# Patient Record
Sex: FEMALE | Race: WHITE
[De-identification: names, ages, dates, MRNs, and addresses within clinical notes are randomized per-mention and may not be internally consistent; named-entity substitution may affect disease eponyms.]

---

## 2017-06-19 ENCOUNTER — HOSPITAL ENCOUNTER (EMERGENCY)
Dept: HOSPITAL 62 - ER | Age: 62
Discharge: HOME | End: 2017-06-19
Payer: MEDICARE

## 2017-06-19 VITALS — SYSTOLIC BLOOD PRESSURE: 148 MMHG | DIASTOLIC BLOOD PRESSURE: 89 MMHG

## 2017-06-19 DIAGNOSIS — M25.532: ICD-10-CM

## 2017-06-19 DIAGNOSIS — S52.92XA: Primary | ICD-10-CM

## 2017-06-19 DIAGNOSIS — S62.92XA: ICD-10-CM

## 2017-06-19 DIAGNOSIS — S62.609A: ICD-10-CM
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
## 2017-06-19 PROCEDURE — 96376 TX/PRO/DX INJ SAME DRUG ADON: CPT

## 2017-06-19 PROCEDURE — 73110 X-RAY EXAM OF WRIST: CPT

## 2017-06-19 PROCEDURE — 96374 THER/PROPH/DIAG INJ IV PUSH: CPT

## 2017-06-19 PROCEDURE — 99283 EMERGENCY DEPT VISIT LOW MDM: CPT

## 2017-06-19 PROCEDURE — 29125 APPL SHORT ARM SPLINT STATIC: CPT

## 2017-06-19 PROCEDURE — 2W3DX1Z IMMOBILIZATION OF LEFT LOWER ARM USING SPLINT: ICD-10-PCS | Performed by: INTERNAL MEDICINE

## 2017-06-19 NOTE — ER DOCUMENT REPORT
HPI





- HPI


Patient complains to provider of: left wrist pain


Onset: Just prior to arrival


Onset/Duration: Sudden


Quality of pain: Achy


Severity: Moderate


Pain Level: 3


Context: 


Patient presents to the emergency department with left wrist pain. FOOSH 

injury. Patient reports she fell going to the bathroom.  She reports she did 

not get dizzy, she just slipped and fell.  Patient was brought in by EMS and 

received 40 mcg of fentanyl.  Reports denies past medical history of injury to 

the wrist.


Associated Symptoms: None


Exacerbated by: Movement


Relieved by: Denies


Similar symptoms previously: No


Recently seen / treated by doctor: No





- DERM


Skin Color: Normal





Past Medical History





- General


Information source: Patient





- Social History


Smoking Status: Never Smoker


Cigarette use (# per day): No


Frequency of alcohol use: None


Drug Abuse: None


Lives with: Family


Family History: Reviewed & Not Pertinent


Patient has suicidal ideation: No


Patient has homicidal ideation: No





- Medical History


Medical History: Other - lupus


Pulmonary Medical History: Reports: Hx Pneumonia


Endocrine Medical History: Reports: Hx Hypothyroidism


Renal/ Medical History: Denies: Hx Peritoneal Dialysis


Surgical Hx: Negative





Vertical Provider Document





- CONSTITUTIONAL


Agree With Documented VS: Yes


Exam Limitations: No Limitations


General Appearance: WD/WN, Mild Distress - winces when wrist palpated





- INFECTION CONTROL


TRAVEL OUTSIDE OF THE [place] IN LAST 30 DAYS: No





- HEENT


HEENT: Atraumatic, Normocephalic





- NECK


Neck: Supple





- RESPIRATORY


Respiratory: No Respiratory Distress


O2 Sat by Pulse Oximetry: 98





- CARDIOVASCULAR


Cardiovascular: Regular Rate





- MUSCULOSKELETAL/EXTREMETIES


Musculoskeletal/Extremeties: Tender - left wrist with swelling, ttp, good 

radial pulse, brisk cap refill





Course





- Re-evaluation


Re-evalutation: 





06/19/17 13:42


Patient instructed on fracture, plan of care,splint  Patient reports that when 

she had carpal tunnel surgery done in January she was able to take Tylenol with 

codeine.  Patient orthopedic contacted [facility] and appointment arranged 

for her.





- Vital Signs


Vital signs: 


 











Temp Pulse Resp BP Pulse Ox


 


 98.1 F   67   16   147/69 H  98 


 


 06/19/17 11:09  06/19/17 11:09  06/19/17 11:09  06/19/17 11:09  06/19/17 11:09














- Diagnostic Test


Radiology reviewed: Image reviewed, Reports reviewed - impacted  fracture 

distal radius with mild volar angulation





Procedures





- Immobilization


  ** Left Wrist


Immobilizer type: Sugar tong, Sling


Performed by: PCT


Post-Proc Neuro Vasc Exam: Unchanged from pre-exam


Alignment checked and good: Yes





Discharge





- Discharge


Clinical Impression: 


 Elevated blood pressure reading





Left wrist injury


Qualifiers:


 Encounter type: initial encounter Qualified Code(s): S69.92XA - Unspecified 

injury of left wrist, hand and finger(s), initial encounter





Radial fracture


Qualifiers:


 Encounter type: initial encounter Radius location: distal Fracture type: 

closed Fracture morphology: unspecified fracture morphology 





Disposition: HOME, SELF-CARE


Instructions:  Splint Pending Casting ([facility]), Fractured Radius ([facility]), 

Acetaminophen with Codeine ([facility])


Additional Instructions: 


*You have been evaluated for left wrist injury, radial fracture


*Maintain the splint


*Rest/Ice/Elevate your wrist


*Follow up with [facility] Thursday June 22nd at 1:00 pm


*Take medication as prescribed for pain


*Return to ED for worsening condition, changes, needs





Prescriptions: 


Acetaminophen with Codeine [Tylenol #3 Tablet] 1 each PO Q4HP PRN #30 tablet


 PRN Reason: 


Referrals: 


IGOR TREJO MD [Primary Care Provider] - Follow up in 1 week


[facility] [Provider Group] - 06/22/17 1:00 pm

## 2017-06-19 NOTE — RADIOLOGY REPORT (SQ)
EXAM DESCRIPTION:  WRIST LEFT 3 VIEWS



COMPLETED DATE/TIME:  6/19/2017 1:05 pm



REASON FOR STUDY:  fell, wrist pain



COMPARISON:  None.



NUMBER OF VIEWS:  Three views.



TECHNIQUE:  AP, lateral, and oblique radiographic images acquired of the left wrist.



LIMITATIONS:  None.



FINDINGS:  MINERALIZATION: Osteopenia.

BONES: There is a comminuted, impacted fracture of the distal radius with mild volar angulation.

SOFT TISSUES: No soft tissue swelling.  No foreign body.

OTHER: No other significant finding.



IMPRESSION:  Radial fracture.



TECHNICAL DOCUMENTATION:  JOB ID:  [id number]

 2011 [employer]- All Rights Reserved

## 2018-11-04 ENCOUNTER — HOSPITAL ENCOUNTER (OUTPATIENT)
Dept: HOSPITAL 62 - ER | Age: 63
Setting detail: OBSERVATION
LOS: 3 days | Discharge: ANOTHER HOSPITAL | End: 2018-11-07
Attending: INTERNAL MEDICINE | Admitting: INTERNAL MEDICINE
Payer: MEDICARE

## 2018-11-04 DIAGNOSIS — M06.9: ICD-10-CM

## 2018-11-04 DIAGNOSIS — I10: ICD-10-CM
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
## 2018-11-04 DIAGNOSIS — J44.9: ICD-10-CM
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
## 2018-11-04 DIAGNOSIS — Z79.899: ICD-10-CM

## 2018-11-04 DIAGNOSIS — R26.89: ICD-10-CM
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
## 2018-11-04 DIAGNOSIS — M79.7: ICD-10-CM

## 2018-11-04 DIAGNOSIS — M32.9: ICD-10-CM

## 2018-11-04 DIAGNOSIS — Z79.82: ICD-10-CM

## 2018-11-04 DIAGNOSIS — S82.024A: ICD-10-CM

## 2018-11-04 DIAGNOSIS — S82.832A: Primary | ICD-10-CM

## 2018-11-04 DIAGNOSIS — E03.9: ICD-10-CM

## 2018-11-04 LAB
ADD MANUAL DIFF: NO
ANION GAP SERPL CALC-SCNC: 11 MMOL/L (ref 5–19)
BASOPHILS # BLD AUTO: 0.1 10^3/UL (ref 0–0.2)
BASOPHILS NFR BLD AUTO: 1 % (ref 0–2)
BUN SERPL-MCNC: 15 MG/DL (ref 7–20)
CALCIUM: 9.2 MG/DL (ref 8.4–10.2)
CHLORIDE SERPL-SCNC: 104 MMOL/L (ref 98–107)
CO2 SERPL-SCNC: 27 MMOL/L (ref 22–30)
EOSINOPHIL # BLD AUTO: 0.1 10^3/UL (ref 0–0.6)
EOSINOPHIL NFR BLD AUTO: 2.2 % (ref 0–6)
ERYTHROCYTE [DISTWIDTH] IN BLOOD BY AUTOMATED COUNT: 13.1 % (ref 11.5–14)
GLUCOSE SERPL-MCNC: 110 MG/DL (ref 75–110)
HCT VFR BLD CALC: 40.3 % (ref 36–47)
HGB BLD-MCNC: 13.6 G/DL (ref 12–15.5)
LYMPHOCYTES # BLD AUTO: 1.6 10^3/UL (ref 0.5–4.7)
LYMPHOCYTES NFR BLD AUTO: 24.7 % (ref 13–45)
MCH RBC QN AUTO: 30.3 PG (ref 27–33.4)
MCHC RBC AUTO-ENTMCNC: 33.9 G/DL (ref 32–36)
MCV RBC AUTO: 90 FL (ref 80–97)
MONOCYTES # BLD AUTO: 0.4 10^3/UL (ref 0.1–1.4)
MONOCYTES NFR BLD AUTO: 6.3 % (ref 3–13)
NEUTROPHILS # BLD AUTO: 4.4 10^3/UL (ref 1.7–8.2)
NEUTS SEG NFR BLD AUTO: 65.8 % (ref 42–78)
PLATELET # BLD: 234 10^3/UL (ref 150–450)
POTASSIUM SERPL-SCNC: 3.6 MMOL/L (ref 3.6–5)
RBC # BLD AUTO: 4.5 10^6/UL (ref 3.72–5.28)
SODIUM SERPL-SCNC: 141.6 MMOL/L (ref 137–145)
TOTAL CELLS COUNTED % (AUTO): 100 %
WBC # BLD AUTO: 6.6 10^3/UL (ref 4–10.5)

## 2018-11-04 PROCEDURE — L4350 ANKLE CONTROL ORTHO PRE OTS: HCPCS

## 2018-11-04 PROCEDURE — 97116 GAIT TRAINING THERAPY: CPT

## 2018-11-04 PROCEDURE — L1902 AFO ANKLE GAUNTLET PRE OTS: HCPCS

## 2018-11-04 PROCEDURE — L1830 KO IMMOB CANVAS LONG PRE OTS: HCPCS

## 2018-11-04 PROCEDURE — 73610 X-RAY EXAM OF ANKLE: CPT

## 2018-11-04 PROCEDURE — 73700 CT LOWER EXTREMITY W/O DYE: CPT

## 2018-11-04 PROCEDURE — 97163 PT EVAL HIGH COMPLEX 45 MIN: CPT

## 2018-11-04 PROCEDURE — 97110 THERAPEUTIC EXERCISES: CPT

## 2018-11-04 PROCEDURE — 97166 OT EVAL MOD COMPLEX 45 MIN: CPT

## 2018-11-04 PROCEDURE — 96374 THER/PROPH/DIAG INJ IV PUSH: CPT

## 2018-11-04 PROCEDURE — 97530 THERAPEUTIC ACTIVITIES: CPT

## 2018-11-04 PROCEDURE — 73564 X-RAY EXAM KNEE 4 OR MORE: CPT

## 2018-11-04 PROCEDURE — 97535 SELF CARE MNGMENT TRAINING: CPT

## 2018-11-04 PROCEDURE — 99285 EMERGENCY DEPT VISIT HI MDM: CPT

## 2018-11-04 PROCEDURE — G0378 HOSPITAL OBSERVATION PER HR: HCPCS

## 2018-11-04 PROCEDURE — 80048 BASIC METABOLIC PNL TOTAL CA: CPT

## 2018-11-04 PROCEDURE — 85025 COMPLETE CBC W/AUTO DIFF WBC: CPT

## 2018-11-04 PROCEDURE — 36415 COLL VENOUS BLD VENIPUNCTURE: CPT

## 2018-11-04 NOTE — RADIOLOGY REPORT (SQ)
EXAM DESCRIPTION:  KNEE RIGHT 4 VIEWS



COMPLETED DATE/TIME:  11/4/2018 8:21 pm



REASON FOR STUDY:  fall injury with swelling



COMPARISON:  None.



EXAM PARAMETERS:  NUMBER OF VIEWS: Four views.

TECHNIQUE: AP, lateral and both oblique  radiographic images acquired of the right knee.

LIMITATIONS: None.



FINDINGS:  MINERALIZATION: Osteopenia.

BONES: Possible nondisplaced longitudinal fracture in the lateral body of the patella.  No other frac
ture or dislocation.  No worrisome bone lesions.

JOINTS: Small effusion.

SOFT TISSUES: No significant soft tissue swelling.  No radiopaque foreign body.

OTHER: No other significant finding.



IMPRESSION:  Possible nondisplaced longitudinal fracture in the lateral body of the patella.



TECHNICAL DOCUMENTATION:  JOB ID:  [id number]

[id number]

 2011 [employer]- All Rights Reserved



Reading location - IP/workstation name: [email or web address]

## 2018-11-04 NOTE — RADIOLOGY REPORT (SQ)
EXAM DESCRIPTION: 



CT LOWER EXTREMITY WITHOUT IV CONTRAST



COMPLETED DATE/TME:  11/04/2018 21:09



CLINICAL HISTORY: 



63 years, Female, Knee pain



This exam was performed according to our departmental

dose-optimization program which includes automated exposure

control, adjustment of the mA and/or kVp according to patient

size and/or use of iterative reconstruction technique where

applicable.



Findings: There is a mild joint effusion. There is a

calcification in the suprapatellar joint effusion medially,

measuring 1.3 x 0.7 cm. This may represent a loose body. There

are moderate tricompartmental degenerative changes. There is a

nondisplaced vertical patellar fracture noted. Significant

osteophytes are noted.



IMPRESSION:



Nondisplaced acute patellar fracture. Moderate degenerative

changes with probable loose body calcification in the

suprapatellar joint with mild joint effusion.

## 2018-11-04 NOTE — RADIOLOGY REPORT (SQ)
EXAM DESCRIPTION:  ANKLE LEFT COMPLETE



COMPLETED DATE/TIME:  11/4/2018 8:21 pm



REASON FOR STUDY:  fall injury with ? deformity



COMPARISON:  None.



EXAM PARAMETERS:  NUMBER OF VIEWS: Three views.

TECHNIQUE: AP, lateral and oblique  radiographic images acquired of the left ankle.

LIMITATIONS: None.



FINDINGS:  MINERALIZATION: Normal.

BONES: Transverse Nondisplaced fracture of the distal fibula- lower lateral malleolus.  No other frac
ture or dislocation.  No worrisome bone lesions.

JOINTS: No effusion.

SOFT TISSUES: Moderate soft tissue swelling.  No radiopaque foreign body.

OTHER: No other significant finding.



IMPRESSION:  Transverse Nondisplaced fracture of the distal fibula- lower lateral malleolus.  No othe
r fracture or dislocation.



TECHNICAL DOCUMENTATION:  JOB ID:  [id number]

[id number]

 2011 [employer]- All Rights Reserved



Reading location - IP/workstation name: [email or web address]

## 2018-11-04 NOTE — ER DOCUMENT REPORT
ED General





- General


Mode of Arrival: Medic


Information source: Patient, Relative - Daughter


TRAVEL OUTSIDE OF THE [place] IN LAST 30 DAYS: No





- HPI


Onset: Just prior to arrival


Onset/Duration: Sudden


Quality of pain: Sharp, Throbbing





<DAVEY GUZMAN - Last Filed: 11/05/18 00:16>





<MALIK ALMODOVARIN - Last Filed: 11/05/18 00:48>





- General


Chief Complaint: Fall Injury


Stated Complaint: KNEE PAIN


Time Seen by Provider: 11/04/18 19:57





- HPI


Notes: 





53-year-old female brought in by ambulance after tripping and falling brought 

in with positive deformity of her left lateral ankle and her right knee.  She 

is complaining of severe pain in her right knee her left ankle over the fibula.

  Patient is unable to bear weight on either extremity.  She did not hit her 

head and there was no loss of consciousness.  Enroute she was given 100 mcg x1 

by the medics and Zofran 4 mg x1. (DAVEY GUZMAN)





- Related Data


Allergies/Adverse Reactions: 


 





hydrochlorothiazide [Hydrochlorothiazide] Allergy (Verified 11/04/18 20:11)


 


morphine [Morphine] Allergy (Verified 11/04/18 20:11)


 


Penicillins Allergy (Verified 11/04/18 20:11)


 


rofecoxib [From Vioxx] Allergy (Verified 11/04/18 20:11)


 


Sulfa (Sulfonamide Antibiotics) Allergy (Verified 11/04/18 20:11)


 











Past Medical History





- General


Information source: Patient





- Social History


Smoking Status: Never Smoker


Frequency of alcohol use: None


Drug Abuse: None


Family History: Reviewed & Not Pertinent


Patient has suicidal ideation: No


Patient has homicidal ideation: No





- Past Medical History


Cardiac Medical History: Reports: Hx Hypertension


Pulmonary Medical History: Reports: Hx Asthma, Hx COPD, Hx Pneumonia


Endocrine Medical History: Reports: Hx Hypothyroidism


Renal/ Medical History: Denies: Hx Peritoneal Dialysis


Musculoskeletal Medical History: Reports Hx Arthritis - RA


Past Surgical History: Reports: Hx Hysterectomy, Hx Orthopedic Surgery - L arm





<DAVEY GUZMAN - Last Filed: 11/05/18 00:16>





Review of Systems





- Review of Systems


Constitutional: Weakness.  denies: Chills, Diaphoresis


EENT: No symptoms reported


Cardiovascular: No symptoms reported


Respiratory: No symptoms reported


Gastrointestinal: No symptoms reported


Musculoskeletal: Deformity, Leg swelling





<DAVEY GUZMAN - Last Filed: 11/05/18 00:16>





Physical Exam





- General


General appearance: Appears well, Anxious


In distress: Mild





- HEENT


Head: Normocephalic


Eyes: Normal


Conjunctiva: Normal


Extraocular movements intact: Yes





- Respiratory


Respiratory status: No respiratory distress


Chest status: Nontender


Breath sounds: Normal


Chest palpation: Normal





- Cardiovascular


Rhythm: Regular


Heart sounds: Normal auscultation, S1 appreciated, S2 appreciated


Murmur: Yes


Systolic murmur grade 1-6: 2


Pulses: Normal: Posterior tibial - Bilateral 2+, Dorsalis pedis - Bilateral 2+





- Abdominal


Inspection: Normal


Distension: No distension


Tenderness: Nontender





- Extremities


General lower extremity: Tender - Exquisitely tender to palpation R knee around 

patella. Positive TTP L lateral malleolus


Knee: Tender, Abrasion, Pain with ROM, Tender joint line - R knee TTP, abrasion 

noted anterolateral, severe pain with ROM, cannot bear weight, Unable to bear 

weight.  No: Popliteal fossa tender


Calf: Normal


Ankle: Tender, Deformity, Edema, Limited ROM, Unable to bear weight - L ankle 

TTP lateral malleolus, edema, mild deformity


Foot: Normal





- Neurological


Sensory: Altered light touch - Pt with normal sensation to light touch R lower 

extremity, decreased sensation to light touch L lower extremity.





<DAVEY GUZMAN - Last Filed: 11/05/18 00:16>





- Vital signs


Vitals: 





 











Temp Pulse Resp BP Pulse Ox


 


 98.2 F   72   24 H  142/89 H  100 


 


 11/04/18 19:34  11/04/18 19:34  11/04/18 19:34  11/04/18 19:34  11/04/18 19:34














Course





- Laboratory


Result Diagrams: 


 11/04/18 22:55





 11/04/18 22:55





<DAVEY GUZMAN - Last Filed: 11/05/18 00:16>





- Laboratory


Result Diagrams: 


 11/04/18 22:55





 11/04/18 22:55





<HAYLEE ALMODOVAR - Last Filed: 11/05/18 00:48>





- Re-evaluation


Re-evalutation: 





11/04/18 21:23


XR of distal L extremity shows fibula fx. Radiology read of R knee indicates 

possible longitudinal patella fx. Questionable lipohemarthrosis R knee. Pt 

endorses severe pain. Will give fentanyl 50 mcg x 1. 


11/04/18 22:42


CT L lower extremity completed. Confirms R non-displaced acute patellar fx 


11/04/18 22:47





11/04/18 23:52


Patient was placed in a L ankle stirrup brace and a R knee immobilizer and 

attempted to ambulate on crutches. Pt had great difficulty ambulating 10 feet 

and with severe pain and deemed unable to safely ambulate. Pt stated she will 

not be able to safely ambulate at home. Discussed with Dr. Almodovar and best 

course of action is to admit patient overnight. Consulted with hospitalist, she 

is concerned about medical management and is going to contact orthopedics. 

Waiting to speak with her. 


11/05/18 00:15


Nishi discussed with Dr. Allen will admit to medical floor. (DAVEY GUZMAN)








11/05/18 00:46


I did evaluate the patient at bedside patient has a x-ray showing a left distal 

fibula fracture along with a patella fracture on the right.  We did apply a 

ankle stirrup splint and knee immobilizer splint to the left immobilizer to the 

right leg and supply crutches to the patient patient had a extremely difficult 

time and walking taking approximately 10 minutes to go less than 10 feet.  

Because of difficulty in ambulation recommend at this time patient be admitted 

to the hospital staff for further evaluation.  At this time do not see any need 

for emergent operative management of these fractures hospitalist will consult 

orthopedics discussed with Dr. Landeros agrees with admission to the floor (

HAYLEE ALMODOVAR)





- Vital Signs


Vital signs: 





 











Temp Pulse Resp BP Pulse Ox


 


 99.2 F   72   20   158/80 H  99 


 


 11/04/18 21:45  11/04/18 21:45  11/04/18 21:45  11/04/18 21:45  11/04/18 21:45














Discharge





- Discharge


Admitting Provider: Hospitalist - Barre


Unit Admitted: Medical Floor





<DAVEY GUZMAN - Last Filed: 11/05/18 00:16>





<HAYLEE ALMODOVAR - Last Filed: 11/05/18 00:48>





- Discharge


Clinical Impression: 


 Inability to ambulate due to knee, Inability to ambulate due to ankle or foot, 

Fibromyalgia





Fracture of distal fibula


Qualifiers:


 Encounter type: initial encounter Fracture type: closed Fracture morphology: 

unspecified fracture morphology Laterality: left Qualified Code(s): S82.832A - 

Other fracture of upper and lower end of left fibula, initial encounter for 

closed fracture





Patella fracture


Qualifiers:


 Encounter type: initial encounter Fracture type: closed Fracture morphology: 

longitudinal Fracture alignment: nondisplaced Laterality: right Qualified Code(s

): S82.024A - Nondisplaced longitudinal fracture of right patella, initial 

encounter for closed fracture





Lupus


Qualifiers:


 Lupus erythematosus form: unspecified Qualified Code(s): L93.0 - Discoid lupus 

erythematosus





Hypertension


Qualifiers:


 Hypertension type: essential hypertension Qualified Code(s): I10 - Essential (

primary) hypertension





Condition: Stable


Disposition: ADMITTED AS OBSERVATION

## 2018-11-05 RX ADMIN — ACETAMINOPHEN AND CODEINE PHOSPHATE PRN EACH: 300; 30 TABLET ORAL at 02:17

## 2018-11-05 RX ADMIN — ACETAMINOPHEN AND CODEINE PHOSPHATE PRN EACH: 300; 30 TABLET ORAL at 20:06

## 2018-11-05 RX ADMIN — KETOROLAC TROMETHAMINE PRN MG: 30 INJECTION, SOLUTION INTRAMUSCULAR at 21:59

## 2018-11-05 RX ADMIN — KETOROLAC TROMETHAMINE PRN MG: 30 INJECTION, SOLUTION INTRAMUSCULAR at 15:16

## 2018-11-05 RX ADMIN — HEPARIN SODIUM SCH UNIT: 5000 INJECTION, SOLUTION INTRAVENOUS; SUBCUTANEOUS at 06:17

## 2018-11-05 RX ADMIN — ACETAMINOPHEN AND CODEINE PHOSPHATE PRN EACH: 300; 30 TABLET ORAL at 11:11

## 2018-11-05 RX ADMIN — ACETAMINOPHEN AND CODEINE PHOSPHATE PRN EACH: 300; 30 TABLET ORAL at 06:32

## 2018-11-05 RX ADMIN — HEPARIN SODIUM SCH UNIT: 5000 INJECTION, SOLUTION INTRAVENOUS; SUBCUTANEOUS at 15:17

## 2018-11-05 RX ADMIN — HEPARIN SODIUM SCH UNIT: 5000 INJECTION, SOLUTION INTRAVENOUS; SUBCUTANEOUS at 21:59

## 2018-11-05 NOTE — PDOC CONSULTATION
Consultation


Consult Date: 11/05/18


Consult reason:: Left lateral malleolus fracture and right patella fracture





History of Present Illness


Admission Date/PCP: 


  11/05/18 00:37





  IGOR TREJO MD





Patient complains of: Left ankle pain and right knee pain status post fall


History of Present Illness: 


OBDULIA PIERCE is a 63 year old female with osteo-porosis.  She had a 

mechanical fall twisting her left ankle and falling directly onto her right 

knee.  Immediately she had pain in both the ankle and the right knee and was 

brought to the hospital for evaluation.  She denies any head injury or loss of 

consciousness.  Only complains of swelling and pain of the mentioned 

extremities above.  Describes the pain 5 out of 5 for both joints and decreased 

range of motion secondary to pain.  No previous surgeries or injuries to those 

extremities.  Orthopedic consulted after x-rays revealed nondisplaced lateral 

malleolus ankle fracture in the left and a nondisplaced right patella fracture.

  Denies any numbness tingling or paresthesias


With





Past Medical History


Cardiac Medical History: Reports: Hypertension


Pulmonary Medical History: Reports: Asthma, Chronic Obstructive Pulmonary 

Disease (COPD), Pneumonia


EENT Medical History: Reports: Other


Endocrine Medical History: Reports: Hypothyroidism


GI Medical History: Reports: Gastroesophageal Reflux Disease


Musculoskeltal Medical History: Reports: Arthritis, Fibromyalgia, Other - 

Rheumatoid arthritis


Hematology: Reports: Other





Past Surgical History


Past Surgical History: Reports: Hysterectomy, Orthopedic Surgery - L arm





Social History


Smoking Status: Never Smoker


Frequency of Alcohol Use: None


Hx Recreational Drug Use: No


Drugs: None


Hx Prescription Drug Abuse: No





Family History


Family History: Reviewed & Not Pertinent


Parental Family History Reviewed: No


Children Family History Reviewed: No


Sibling(s) Family History Reviewed.: No





Medication/Allergy


Home Medications: 








Aspirin [Aspirin EC] 81 mg PO DAILY 11/05/18 


Budesonide/Formoterol Fumarate [Symbicort 160-4.5 Mcg Inhaler] 2 puff IH BID 11/ 05/18 


Dexlansoprazole [Dexilant 60 mg Capsule] 60 mg PO DAILY 11/05/18 


Duloxetine HCl [Cymbalta] 30 mg PO DAILY 11/05/18 


Ergocalciferol (Vitamin D2) [Vitamin D2] 50,000 unit PO TU@1000 11/05/18 


Furosemide [Lasix 20 mg Tablet] 20 mg PO DAILY 11/05/18 


Levothyroxine Sodium [Synthroid 0.112 mg Tablet] 0.112 mg PO DAILY 11/05/18 


Potassium Chloride [Klor-Con 10 Meq Capsule ER] 10 meq PO DAILY 11/05/18 








Allergies/Adverse Reactions: 


 





hydrochlorothiazide [Hydrochlorothiazide] Allergy (Verified 11/05/18 09:53)


 SWELLING


morphine [Morphine] Allergy (Verified 11/05/18 09:53)


 Hives


Penicillins Allergy (Verified 11/05/18 09:53)


 Hives


rofecoxib [From Vioxx] Allergy (Verified 11/05/18 09:53)


 SWELLING


Sulfa (Sulfonamide Antibiotics) Allergy (Verified 11/05/18 09:53)


 THROAT ITCHING & BURNING











Review of Systems


Review of Systems: 





Constitutional: [PRESENT: as per HPI.  ABSENT: chills, fever(s), headache(s), 

weight gain, weight loss]


Eyes: [ABSENT: visual disturbances]


Ears: [ABSENT: hearing changes]


Cardiovascular: [ABSENT: chest pain, dyspnea on exertion, edema, orthropnea, 

palpitations]


Respiratory: [ABSENT: cough, hemoptysis]


Gastrointestinal: [ABSENT: abdominal pain, constipation, diarrhea, hematemesis, 

hematochezia, nausea, vomiting]


Genitourinary: [ABSENT: dysuria, hematuria]


Musculoskeletal: See HPI


Integumentary: [ABSENT: rash, wounds]


Neurological: [ABSENT: abnormal gait, abnormal speech, confusion, dizziness, 

focal weakness, syncope]


Psychiatric: [ABSENT: anxiety, depression, homicidal ideation, suicidal ideation

]


Endocrine: [ABSENT: cold intolerance, heat intolerance, menstrual abnormalities

, polydipsia, polyuria]


Hematologic/Lymphatic: [ABSENT: easy bleeding, easy bruising, lymphadenopathy]





Physical Exam


Vital Signs: 


 











Temp Pulse Resp BP Pulse Ox


 


 36.9 C   72   16   139/68 H  96 


 


 11/05/18 07:17  11/05/18 07:17  11/05/18 07:17  11/05/18 07:17  11/05/18 07:17








 Intake & Output











 11/04/18 11/05/18 11/06/18





 06:59 06:59 06:59


 


Intake Total  336 


 


Balance  336 


 


Weight  95.2 kg 











General appearance: PRESENT: no acute distress


Head exam: PRESENT: atraumatic, normocephalic


Eye exam: PRESENT: EOMI, other - Round symmetric pupils


Ear exam: PRESENT: normal external ear exam.  ABSENT: bleeding, drainage


Mouth exam: PRESENT: neck supple


Neck exam: ABSENT: lymphadenopathy, thyromegaly


Respiratory exam: PRESENT: symmetrical, unlabored.  ABSENT: accessory muscle use

, tachypnea, wheezes


Cardiovascular exam: PRESENT: RRR


Pulses: PRESENT: normal dorsalis pedis pul


Vascular exam: PRESENT: normal capillary refill


GI/Abdominal exam: PRESENT: soft - 100.  ABSENT: distended, tenderness


Neurological exam: PRESENT: alert, awake, oriented to person, oriented to place

, oriented to time, oriented to situation


Psychiatric exam: PRESENT: appropriate affect, normal mood


Skin exam: PRESENT: abrasion - Over the right knee, intact, normal color.  

ABSENT: skin tears - 60


Adult Front & Back Image: 


  __________________________














  __________________________





 1 - Superficial abrasion of the right knee.  Tender palpation over the 

patella.  Swelling present but not remarkable.  Decreased range of motion pain.

  Neurovascular intact distally.





 2 - Left ankle swelling laterally with tenderness over the lateral malleolus.  

Some crepitus with palpation.  Decreased range of motion second to pain.  

Neurovascular intact distally








Results


Impressions: 


 





Ankle X-Ray  11/04/18 19:43


IMPRESSION:  Transverse Nondisplaced fracture of the distal fibula- lower 

lateral malleolus.  No other fracture or dislocation.


 








Knee X-Ray  11/04/18 19:43


IMPRESSION:  Possible nondisplaced longitudinal fracture in the lateral body of 

the patella.


 








Lower Extremity CT  11/04/18 21:09


IMPRESSION:


 


Nondisplaced acute patellar fracture. Moderate degenerative


changes with probable loose body calcification in the


suprapatellar joint with mild joint effusion.


 











Status: Image reviewed by me





Assessment & Plan





- Diagnosis


(1) Fracture of distal fibula


Qualifiers: 


   Encounter type: initial encounter   Fracture type: closed   Fracture 

morphology: unspecified fracture morphology   Laterality: left   Qualified Code(

s): S82.832A - Other fracture of upper and lower end of left fibula, initial 

encounter for closed fracture   


Is this a current diagnosis for this admission?: Yes   


Plan: 


Displaced left distal fibula fracture.  Transverse under the mortise.  

Recommend protected weightbearing with walking and ice and elevate when not 

ambulating.  Continue the Aircast until she sees us in the office where we will 

apply a walking boot.








(2) Patella fracture


Qualifiers: 


   Encounter type: initial encounter   Fracture type: closed   Fracture 

morphology: longitudinal   Fracture alignment: nondisplaced   Laterality: right

   Qualified Code(s): S82.024A - Nondisplaced longitudinal fracture of right 

patella, initial encounter for closed fracture   


Is this a current diagnosis for this admission?: Yes   


Plan: 


Nondisplaced lateral facet fracture of the patella on the right knee.  is a 

longitudinal fracture of the lateral facet.  No step-off.  Patient can ambulate 

or weight-bear as tolerated with the right lower extremity but unable and 

restricted from doing range of motion due to the fracture.  When she ambulates 

she is required to use the knee immobilizer.  She may loosen or remove the knee 

immobilizer when and the right knee is straight.  Recommend using a walker for 

ambulation.








- Plan Summary


Plan Summary: 





Again protected weightbearing left lower extremity with splint on.  Weight-bear 

as tolerated of the right knee with a knee immobilizer on.  Unable to do range 

of motion due to the fracture.


Recommending physical therapy but she most likely will require a stint and 

rehab due to both extremities being fractured.


Continue pain control and DVT prophylaxis.

## 2018-11-06 RX ADMIN — HEPARIN SODIUM SCH UNIT: 5000 INJECTION, SOLUTION INTRAVENOUS; SUBCUTANEOUS at 21:25

## 2018-11-06 RX ADMIN — ACETAMINOPHEN AND CODEINE PHOSPHATE PRN EACH: 300; 30 TABLET ORAL at 13:45

## 2018-11-06 RX ADMIN — KETOROLAC TROMETHAMINE PRN MG: 30 INJECTION, SOLUTION INTRAMUSCULAR at 21:27

## 2018-11-06 RX ADMIN — HEPARIN SODIUM SCH UNIT: 5000 INJECTION, SOLUTION INTRAVENOUS; SUBCUTANEOUS at 06:04

## 2018-11-06 RX ADMIN — KETOROLAC TROMETHAMINE PRN MG: 30 INJECTION, SOLUTION INTRAMUSCULAR at 06:07

## 2018-11-06 RX ADMIN — KETOROLAC TROMETHAMINE PRN MG: 30 INJECTION, SOLUTION INTRAMUSCULAR at 12:05

## 2018-11-06 RX ADMIN — ACETAMINOPHEN AND CODEINE PHOSPHATE PRN EACH: 300; 30 TABLET ORAL at 08:26

## 2018-11-06 RX ADMIN — ACETAMINOPHEN AND CODEINE PHOSPHATE PRN EACH: 300; 30 TABLET ORAL at 23:15

## 2018-11-06 RX ADMIN — HEPARIN SODIUM SCH UNIT: 5000 INJECTION, SOLUTION INTRAVENOUS; SUBCUTANEOUS at 13:47

## 2018-11-06 NOTE — PDOC H&P
History of Present Illness


Admission Date/PCP: 


  11/05/18 00:37





  IGOR TREJO MD





Patient complains of: Left ankle pain and difficulty with mobility


History of Present Illness: 


KEYLA GALEAS is a 63-year-old female with past medical history of hypertension

, asthma, COPD, pneumonia, hypothyroidism, GERD, rheumatoid arthritis, 

fibromyalgia, systemic lupus erythematosus, hysterectomy, and left wrist ORIF 

admitted to [facility] on 11/5/2018 status post fall from 

standing height at home during which she slipped on a small pumpkin that had 

landed at the edge of her doorstep causing her to twist her left lower 

extremity and fall onto her right knee.  X-rays/CT imaging demonstrated a 

transverse nondisplaced fracture of the distal fibula on the left and a 

nondisplaced acute patellar fracture with mild joint effusion and questionable 

lipohemarthrosis of the right knee.  Orthopedic surgery was not contacted 

immediately due to the likely nonoperative nature of this case, but the patient 

had significant difficulty with ambulation using crutches and a knee 

immobilizer on the right.  Therefore, the patient was evaluated by orthopedic 

surgery and recommended protected weightbearing with walking (toe-touch 

weightbearing) and ice and elevation when not ambulating on the left with 

continued Aircast until she is seen in the office for application of a walking 

boot as well as weightbearing as tolerated with a knee immobilizer on the right 

lower extremity.  The patient is allowed to loosen or remove the knee 

immobilizer when she is in bed and the right knee is straight.  Physical 

medicine and rehabilitation consultation was requested to evaluate the patient 

for admission to acute inpatient rehabilitation.





Today, the patient was seen and examined with her  at bedside.  She 

complains of continued left ankle pain and admits that she confused the 

weightbearing restrictions and placed all of her weight through her left ankle 

when attempting to go to the bathroom.  Her left ankle has subsequently become 

swollen and warm.  Additionally, she admits that she does have arthritis 

related to her lupus, and she occasionally requires the use of a rolling walker 

for ambulation.  However, she was active and independent otherwise prior to 

this injury.  Her last bowel movement was 2 days ago, and she denies trouble 

with urination.








Past Medical History


Cardiac Medical History: Reports: Hypertension


Pulmonary Medical History: Reports: Asthma, Chronic Obstructive Pulmonary 

Disease (COPD), Pneumonia


EENT Medical History: Reports: Other


Endocrine Medical History: Reports: Hypothyroidism


GI Medical History: Reports: Gastroesophageal Reflux Disease


Musculoskeltal Medical History: Reports: Arthritis, Fibromyalgia, Other - 

Rheumatoid arthritis


Hematology: Reports: Other





Past Surgical History


Past Surgical History: Reports: Hysterectomy, Orthopedic Surgery - L arm





Social History


Smoking Status: Never Smoker


Frequency of Alcohol Use: None


Hx Recreational Drug Use: No


Drugs: None


Hx Prescription Drug Abuse: No


Past Social History Note: 


Keyla Galeas lives with her , daughter, and 3 young children in a 1 

level home with 1 steps to enter and 0 steps to the bedroom and bathroom.  She 

is a retired schoolteacher, does not smoke, does not drink alcohol, and does 

not use drugs. 





Prior Functional Status: Active and independent with mobility and all ADLs. 

Ambulates with a rolling walker occasionally.





Current Functional Status: Per therapy notes, the patient currently requires 

standby assistance for bed mobility, minimal assistance for 2 people for 

transfers, and minimum assistance for ambulation 20 feet with a rolling walker.

  Occupational therapy evaluation is pending.





Family History: Reviewed and oncontributory to the current presentation.








Family History


Family History: Reviewed & Not Pertinent


Parental Family History Reviewed: Yes


Children Family History Reviewed: Yes


Sibling(s) Family History Reviewed.: Yes





Medication/Allergy


Home Medications: 








Aspirin [Aspirin EC] 81 mg PO DAILY 11/05/18 


Budesonide/Formoterol Fumarate [Symbicort 160-4.5 Mcg Inhaler] 2 puff IH BID 11/ 05/18 


Dexlansoprazole [Dexilant 60 mg Capsule] 60 mg PO DAILY 11/05/18 


Duloxetine HCl [Cymbalta] 30 mg PO DAILY 11/05/18 


Ergocalciferol (Vitamin D2) [Vitamin D2] 50,000 unit PO TU@1000 11/05/18 


Furosemide [Lasix 20 mg Tablet] 20 mg PO DAILY 11/05/18 


Levothyroxine Sodium [Synthroid 0.112 mg Tablet] 0.112 mg PO DAILY 11/05/18 


Potassium Chloride [Klor-Con 10 Meq Capsule ER] 10 meq PO DAILY 11/05/18 








Allergies/Adverse Reactions: 


 





hydrochlorothiazide [Hydrochlorothiazide] Allergy (Verified 11/05/18 09:53)


 SWELLING


morphine [Morphine] Allergy (Verified 11/05/18 09:53)


 Hives


Penicillins Allergy (Verified 11/05/18 09:53)


 Hives


rofecoxib [From Vioxx] Allergy (Verified 11/05/18 09:53)


 SWELLING


Sulfa (Sulfonamide Antibiotics) Allergy (Verified 11/05/18 09:53)


 THROAT ITCHING & BURNING











Review of Systems


Review of Systems: 


Constitutional: No fevers, chills, sweats, weight loss


Eye: No recent visual problems, no blurry vision, no double vision


ENMT: No ear pain, nasal congestion, sore throat


Respiratory: No shortness of breath, cough, sputum production


Cardiovascular: No chest pain, palpitations, syncope


Gastrointestinal: No nausea, vomiting, diarrhea, abdominal pain


Genitourinary: No hematuria, dysuria, flank or suprapubic pain


Hema/Lymph: Negative for bruising tendency, swollen lymph glands


Endocrine: Negative for excessive thirst, excessive hunger, extreme fatigue


Musculoskeletal: Positive for left ankle and right knee pain as well as 

resulting difficulty with mobility.


Integumentary: No rash, pruritus, abrasions


Neurologic: No headaches, numbness, speech problems.  Positive for focal 

weakness of bilateral lower extremity secondary to pain.


Psychiatric: No anxiety, depression





Physical Exam


Vital Signs: 


 











Temp Pulse Resp BP Pulse Ox


 


 98.4 F   71   15   163/87 H  98 


 


 11/06/18 07:57  11/06/18 07:57  11/06/18 07:57  11/06/18 07:57  11/06/18 07:57








 Intake & Output











 11/05/18 11/06/18 11/07/18





 06:59 06:59 06:59


 


Intake Total 336 1409 


 


Balance 336 1409 


 


Weight 95.2 kg  











Exam: 


General: Awake and Alert. No acute distress. Resting comfortably in bed with 

her  at bedside.


Head: Normocephalic. Atraumatic.


Eyes: Pupils equal, round, and reactive to light. EOMI. Sclera white.


Ears: No drainage noted.


Nose: Nares normal & without exudate.


Oropharynx: Moist mucous membranes.


Neck: Supple movements.


Cardiovascular: Regular rate & rhythm. No murmurs, rubs, or gallops appreciated.


Pulmonary: Lungs clear to auscultation bilaterally. No increased work of 

breathing.


Gastrointestinal: Abdomen soft, non-tender, non-distended. Normoactive bowel 

sounds.


Skin: Texture and turgor normal. Warm and dry.


Psychiatric: Judgement and insight appear to be good. Patient is oriented to 

date, location, and situation. Affect appropriate.


Extremities: The right lower extremity is in a knee immobilizer.  The left 

ankle is edematous and warm to touch and in an Aircast.


Neurological: CN III-XII grossly intact. Sensation to light touch is grossly 

intact. Tone is normal. Proprioception is normal. No Thompson's. Speech is 

fluent with good content and without dysarthria.


Muscle Strength: Full 5/5 strength in all major muscle groups of the upper 

extremities, although she does wince in pain with resisted shoulder abduction 

due to her arthritis.  She demonstrates 0/5 strength of right hip flexors 

secondary to pain in the right lower extremity.  The right knee flexors/

extensors were not tested due to immobilizer in place.  She has 5/5 strength of 

right ankle and toe dorsiflexors/plantar flexors.  She has 3/5 strength of left 

hip and knee flexors/extensors, and the left ankle dorsiflexors/plantar flexors 

were not tested.





Results


Impressions: 


 





Ankle X-Ray  11/04/18 19:43


IMPRESSION:  Transverse Nondisplaced fracture of the distal fibula- lower 

lateral malleolus.  No other fracture or dislocation.


 








Knee X-Ray  11/04/18 19:43


IMPRESSION:  Possible nondisplaced longitudinal fracture in the lateral body of 

the patella.


 








Lower Extremity CT  11/04/18 21:09


IMPRESSION:


 


Nondisplaced acute patellar fracture. Moderate degenerative


changes with probable loose body calcification in the


suprapatellar joint with mild joint effusion.


 














Assessment & Plan





- Plan Summary


Plan Summary: 


Assessment and Plan:


63-year-old female with multiple traumatic lower extremity fractures secondary 

to mechanical fall being treated nonoperatively.





1. Gait and ADL Dysfunction secondary to multiple traumatic lower extremity 

fracture


- Continue PT and OT to maximize mobility, safety, endurance, and self-care.





2.  Left fibular fracture


- Recommended toe-touch weightbearing on the left lower extremity by orthopedic 

surgery


- The patient did have an episode of placing full weight on that ankle due to 

confusion about weightbearing restrictions, so repeat x-rays are being obtained 

to ensure no further displacement or necessary intervention


- Pain control per hospitalist medicine





3.  Right patellar fracture


- Weightbearing as tolerated on the right lower extremity with a knee 

immobilizer in place


- The knee immobilizer while in bed when the knee is straight


- Pain control per hospitalist medicine





4.  SLE/rheumatoid arthritis


- Comorbidity impacting the patient's rehabilitation course


- Resume patient's home regimen as appropriate





5.  Hypertension


- Acutely elevated secondary to pain


- Continue management per hospitalist medicine





6.  Disposition


- Based on the patient's diagnosis, medical co-morbidities, and current 

functional status, she is a good candidate for acute inpatient rehabilitation 

as she would benefit from 3 hours per day of intensive therapies in at least 2 

disciplines under the close medical supervision of a physician. The patient is 

expected to make significant gains in a relatively short period of time to the 

point that she can safely be discharged home with supervision and assistance 

from family.  





The patient was active and independent prior to admission despite her 

rheumatoid arthritis impacting her gait and necessitating the use of a rolling 

walker intermittently.  Now, the patient is severely limited in her mobility 

and ADL tasks due to her recent fractures and weightbearing restrictions.  She 

requires significant assistance to maintain safety and complete her necessary 

mobility and ADL tasks in a timely manner.  Factoring in her current functional 

status, she is not safe to discharge home with the level of assistance that she 

has available, namely her  who works full-time.  Patient would benefit 

him a high intensity short course of rehabilitation overseen by rehabilitation 

physician due to her multiple medical comorbidities that may impact her 

rehabilitation course, including her SLE, rheumatoid arthritis, COPD, and 

fibromyalgia.





Barring any unforeseen events or complications, there is a plan to admit the 

patient to acute inpatient rehabilitation at [facility] in [place] on 

Wednesday, 11/7/2018.





This case was discussed with the patient's acute care therapists, nurse on the 

floor, discharge planners, and rehabilitation coordinator at [facility]. 

Thank you for allowing us to participate in the care of this patient. Please 

call with any questions.





A total of 80 minutes was spent on face-to-face communication with the patient 

and coordination of care.

## 2018-11-06 NOTE — RADIOLOGY REPORT (SQ)
EXAM DESCRIPTION:  ANKLE LEFT COMPLETE



COMPLETED DATE/TIME:  11/6/2018 11:49 am



REASON FOR STUDY:  left ankle swolen, please compare to prior xray



COMPARISON:  11/4/2018



NUMBER OF VIEWS:  Three views.



TECHNIQUE:  AP, lateral, and oblique radiographic images acquired of the left ankle.



LIMITATIONS:  None.



FINDINGS:  Unchanged nondisplaced transverse fracture distal fibula inferior to the mortise.  No othe
r fracture identified.



IMPRESSION:  Unchanged nondisplaced fracture distal fibula.



TECHNICAL DOCUMENTATION:  JOB ID:  [id number]

 2011 [employer]- All Rights Reserved



Reading location - IP/workstation name: [email or web address]

## 2018-11-06 NOTE — PDOC PROGRESS REPORT
Subjective


Progress Note for:: 11/06/18


Subjective:: 





Still with pain left verbalized area and right knee.  She called Tylenol with 

Codeine which is helping.  Denies fever or chills, no chest pain or shortness 

of breath or palpitation.  She is looking forward to going to rehab and trying 

to walk.


Reason For Visit: 


INTRACTABLE LOWER EXTREMITIES PAIN








Physical Exam


Vital Signs: 


 











Temp Pulse Resp BP Pulse Ox


 


 98.4 F   71   15   163/87 H  98 


 


 11/06/18 07:57  11/06/18 07:57  11/06/18 07:57  11/06/18 07:57  11/06/18 07:57








 Intake & Output











 11/05/18 11/06/18 11/07/18





 06:59 06:59 06:59


 


Intake Total 336 1409 


 


Balance 336 1409 


 


Weight 95.2 kg  











GENERAL: Well-developed, well-nourished female, no acute distress


HEENT: Normocephalic/atraumatic


NECK supple, no JVD


CARDIOVASCULAR: RRR, normal S1-S2


LUNGS: CTA bilaterally


ABDOMEN: Soft, NT, NL bowel sounds


EXTREMITIES: No edema, clubbing, cyanosis; right knee and left ankle 

immobilizers in place


NEUROLOGICAL: Alert, oriented x 3, grossly nonfocal








Results


Impressions: 


 





Knee X-Ray  11/04/18 19:43


IMPRESSION:  Possible nondisplaced longitudinal fracture in the lateral body of 

the patella.


 








Lower Extremity CT  11/04/18 21:09


IMPRESSION:


 


Nondisplaced acute patellar fracture. Moderate degenerative


changes with probable loose body calcification in the


suprapatellar joint with mild joint effusion.


 








Ankle X-Ray  11/06/18 00:00


IMPRESSION:  Unchanged nondisplaced fracture distal fibula.


 














Assessment & Plan





- Diagnosis


(1) Fracture of fibula, distal, left, closed


Qualifiers: 


   Encounter type: subsequent encounter 


Is this a current diagnosis for this admission?: Yes   





(2) Fracture of right patella


Is this a current diagnosis for this admission?: Yes   





(3) Lupus


Qualifiers: 


   Lupus erythematosus form: unspecified   Qualified Code(s): L93.0 - Discoid 

lupus erythematosus   


Is this a current diagnosis for this admission?: Yes   





(4) Gait disturbance


Is this a current diagnosis for this admission?: Yes   





(5) Hypertension


Qualifiers: 


   Hypertension type: essential hypertension   Qualified Code(s): I10 - 

Essential (primary) hypertension   


Is this a current diagnosis for this admission?: Yes   





(6) COPD (chronic obstructive pulmonary disease)


Is this a current diagnosis for this admission?: Yes   





- Plan Summary


Plan Summary: 





Patient is medically stable.  Will continue management.  Orthopedic and 

physiatry specialists notes/evaluations appreciated.  We will continue physical 

therapy.  Plan for likely discharge to rehab in a.m./when bed available.





Follow-up CBC and Chem-7 in a.m.

## 2018-11-07 VITALS — SYSTOLIC BLOOD PRESSURE: 146 MMHG | DIASTOLIC BLOOD PRESSURE: 61 MMHG

## 2018-11-07 LAB
ADD MANUAL DIFF: NO
ANION GAP SERPL CALC-SCNC: 8 MMOL/L (ref 5–19)
BASOPHILS # BLD AUTO: 0 10^3/UL (ref 0–0.2)
BASOPHILS NFR BLD AUTO: 0.7 % (ref 0–2)
BUN SERPL-MCNC: 14 MG/DL (ref 7–20)
CALCIUM: 8.9 MG/DL (ref 8.4–10.2)
CHLORIDE SERPL-SCNC: 105 MMOL/L (ref 98–107)
CO2 SERPL-SCNC: 29 MMOL/L (ref 22–30)
EOSINOPHIL # BLD AUTO: 0.2 10^3/UL (ref 0–0.6)
EOSINOPHIL NFR BLD AUTO: 5.1 % (ref 0–6)
ERYTHROCYTE [DISTWIDTH] IN BLOOD BY AUTOMATED COUNT: 12.7 % (ref 11.5–14)
GLUCOSE SERPL-MCNC: 93 MG/DL (ref 75–110)
HCT VFR BLD CALC: 36.2 % (ref 36–47)
HGB BLD-MCNC: 12.5 G/DL (ref 12–15.5)
LYMPHOCYTES # BLD AUTO: 1.4 10^3/UL (ref 0.5–4.7)
LYMPHOCYTES NFR BLD AUTO: 37.1 % (ref 13–45)
MCH RBC QN AUTO: 30.6 PG (ref 27–33.4)
MCHC RBC AUTO-ENTMCNC: 34.5 G/DL (ref 32–36)
MCV RBC AUTO: 89 FL (ref 80–97)
MONOCYTES # BLD AUTO: 0.3 10^3/UL (ref 0.1–1.4)
MONOCYTES NFR BLD AUTO: 7.7 % (ref 3–13)
NEUTROPHILS # BLD AUTO: 1.8 10^3/UL (ref 1.7–8.2)
NEUTS SEG NFR BLD AUTO: 49.4 % (ref 42–78)
PLATELET # BLD: 185 10^3/UL (ref 150–450)
POTASSIUM SERPL-SCNC: 4.3 MMOL/L (ref 3.6–5)
RBC # BLD AUTO: 4.07 10^6/UL (ref 3.72–5.28)
SODIUM SERPL-SCNC: 142.2 MMOL/L (ref 137–145)
TOTAL CELLS COUNTED % (AUTO): 100 %
WBC # BLD AUTO: 3.7 10^3/UL (ref 4–10.5)

## 2018-11-07 RX ADMIN — HEPARIN SODIUM SCH UNIT: 5000 INJECTION, SOLUTION INTRAVENOUS; SUBCUTANEOUS at 06:07

## 2018-11-07 RX ADMIN — ACETAMINOPHEN AND CODEINE PHOSPHATE PRN EACH: 300; 30 TABLET ORAL at 09:04

## 2018-11-07 RX ADMIN — KETOROLAC TROMETHAMINE PRN MG: 30 INJECTION, SOLUTION INTRAMUSCULAR at 06:11

## 2018-11-07 NOTE — PDOC TRANSFER SUMMARY
General


Admission Date/PCP: 


  11/05/18 00:37





  IGOR TREJO MD





Admission Date: 11/05/18


Transfer Date: 11/07/18





- Transfer Diagnosis


(1) Fracture of fibula, distal, left, closed


Is this a current diagnosis for this admission?: Yes   





(2) Fracture of right patella


Is this a current diagnosis for this admission?: Yes   





(3) Lupus


Is this a current diagnosis for this admission?: Yes   





(4) Gait disturbance


Is this a current diagnosis for this admission?: Yes   





(5) Hypertension


Is this a current diagnosis for this admission?: Yes   





(6) COPD (chronic obstructive pulmonary disease)


Is this a current diagnosis for this admission?: Yes   





- Transfer Medications


Home Medications: 


 





Aspirin [Aspirin EC] 81 mg PO DAILY 11/05/18 


Budesonide/Formoterol Fumarate [Symbicort 160-4.5 Mcg Inhaler] 2 puff IH BID 11/ 05/18 


Dexlansoprazole [Dexilant 60 mg Capsule] 60 mg PO DAILY 11/05/18 


Duloxetine HCl [Cymbalta] 30 mg PO DAILY 11/05/18 


Ergocalciferol (Vitamin D2) [Vitamin D2] 50,000 unit PO TU@1000 11/05/18 


Furosemide [Lasix 20 mg Tablet] 20 mg PO DAILY 11/05/18 


Levothyroxine Sodium [Synthroid 0.112 mg Tablet] 0.112 mg PO DAILY 11/05/18 


Potassium Chloride [Klor-Con 10 Meq Capsule ER] 10 meq PO DAILY 11/05/18 








Transfer Medications: 


 Current Medications





Acetaminophen (Tylenol 650 Mg Supp)  650 mg LA Q4HP PRN


   PRN Reason: FOR PAIN OR TEMP


   Stop: 12/05/18 00:29


Acetaminophen/Codeine Phosphate (Tylenol #3 Tablet)  2 each PO Q4HP PRN


   PRN Reason: MILD PAIN


   Stop: 11/12/18 00:35


   Last Admin: 11/07/18 09:04 Dose:  2 each


Al Hydrox/Mg Hydrox/Simethicone (Maalox Plus Susp 30 Udcup)  30 ml PO Q6HP PRN


   PRN Reason: HEARTBURN


   Stop: 12/05/18 00:29


Heparin Sodium (Porcine) (Heparin Inj 5,000 Units/Ml 1 Ml Syringe)  5,000 unit 

SUBCUT Q8 ROSMERY


   Stop: 12/05/18 05:59


   Last Admin: 11/07/18 06:07 Dose:  5,000 unit


Ketorolac Tromethamine (Toradol Inj/Pf 30 Mg/1 Ml Sdv)  15 mg IV Q6HP PRN


   PRN Reason: SEVERE PAIN


   Stop: 11/10/18 13:00


   Last Admin: 11/07/18 06:11 Dose:  15 mg


Ondansetron HCl (Zofran Inj/Pf 4 Mg/2 Ml Sdv)  4 mg IV Q4H PRN


   PRN Reason: FOR NAUSEA/VOMITING


   Stop: 12/04/18 21:14


Promethazine HCl (Phenergan 25 Mg Tablet)  25 mg PO Q4HP PRN


   PRN Reason: FOR NAUSEA/VOMITING


   Stop: 12/05/18 00:29


Promethazine HCl (Phenergan Inj 25 Mg/1 Ml Vial)  25 mg IV Q4HP PRN


   PRN Reason: FOR NAUSEA/VOMITING


   Stop: 12/05/18 00:29


Temazepam (Restoril 15 Mg Capsule)  15 mg PO HSP PRN


   PRN Reason: SLEEP OR INSOMNIA


   Stop: 11/12/18 00:29











- Allergies


Allergies/Adverse Reactions: 


 





hydrochlorothiazide [Hydrochlorothiazide] Allergy (Verified 11/05/18 09:53)


 SWELLING


morphine [Morphine] Allergy (Verified 11/05/18 09:53)


 Hives


Penicillins Allergy (Verified 11/05/18 09:53)


 Hives


rofecoxib [From Vioxx] Allergy (Verified 11/05/18 09:53)


 SWELLING


Sulfa (Sulfonamide Antibiotics) Allergy (Verified 11/05/18 09:53)


 THROAT ITCHING & BURNING











Hospital Course


Hospital Course: 





63-year-old female history of SLE, presented to the ED after a fall.  This 

happened after she mistakenly stepped on a pumpkin in the dark at the porch 

with her left lower leg.  She twisted the leg and fell on her right knee.  In 

the ED she was found to have left distal fibula fracture and right patella 

fracture.  Patient was admitted to observation status and managed for pain and 

the fractures were immobilized.





Patient was evaluated by orthopedist who recommended the following:.





(1) Fracture of distal fibula, Left


Plan: 


Displaced left distal fibula fracture.  Transverse under the mortise.  

Recommend protected weightbearing with walking and ice and elevate when not 

ambulating.  Continue the Aircast until she sees us in the office where we will 

apply a walking boot.





(2) Patella fracture, Right


Plan: 


Nondisplaced lateral facet fracture of the patella on the right knee.  is a 

longitudinal fracture of the lateral facet.  No step-off.  Patient can ambulate 

or weight-bear as tolerated with the right lower extremity but unable and 

restricted from doing range of motion due to the fracture.  When she ambulates 

she is required to use the knee immobilizer.  She may loosen or remove the knee 

immobilizer when and the right knee is straight.  Recommend using a walker for 

ambulation.








It was also thought that patient will need rehab.  She has been evaluated and 

accepted for inpatient rehab and is being transferred.  She is to follow-up 

with her primary care physician as well as orthopedist, Dr. Shirley Ellis at 

discharge.





Physical Exam


Vital Signs: 


 











Temp Pulse Resp BP Pulse Ox


 


 98.6 F   67   16   146/61 H  95 


 


 11/07/18 00:07  11/07/18 00:07  11/07/18 00:07  11/07/18 00:07  11/07/18 00:07








 Intake & Output











 11/06/18 11/07/18 11/08/18





 06:59 06:59 06:59


 


Intake Total 1409 785 


 


Balance 1409 785 


 


Weight  89.6 kg 








GENERAL: Well-developed, well-nourished female, no acute distress


HEENT: Normocephalic/atraumatic


NECK supple, no JVD


CARDIOVASCULAR: RRR, normal S1-S2


LUNGS: CTA bilaterally


ABDOMEN: Soft, NT, NL bowel sounds


EXTREMITIES: No edema, clubbing, cyanosis; right knee and left ankle 

immobilizers in place.  Distal pulses intact.


NEUROLOGICAL: Alert, oriented x 3, grossly nonfocal














Results


Laboratory Results: 


 





 11/07/18 05:40 





 11/07/18 05:40 





 











  11/07/18 11/07/18





  05:40 05:40


 


WBC  3.7 L 


 


RBC  4.07 


 


Hgb  12.5 


 


Hct  36.2 


 


MCV  89 


 


MCH  30.6 


 


MCHC  34.5 


 


RDW  12.7 


 


Plt Count  185 


 


Seg Neutrophils %  49.4 


 


Lymphocytes %  37.1 


 


Monocytes %  7.7 


 


Eosinophils %  5.1 


 


Basophils %  0.7 


 


Absolute Neutrophils  1.8 


 


Absolute Lymphocytes  1.4 


 


Absolute Monocytes  0.3 


 


Absolute Eosinophils  0.2 


 


Absolute Basophils  0.0 


 


Sodium   142.2


 


Potassium   4.3


 


Chloride   105


 


Carbon Dioxide   29


 


Anion Gap   8


 


BUN   14


 


Creatinine   0.81


 


Est GFR ( Amer)   > 60


 


Est GFR (Non-Af Amer)   > 60


 


Glucose   93


 


Calcium   8.9











Impressions: 


 





Knee X-Ray  11/04/18 19:43


IMPRESSION:  Possible nondisplaced longitudinal fracture in the lateral body of 

the patella.


 








Lower Extremity CT  11/04/18 21:09


IMPRESSION:


 


Nondisplaced acute patellar fracture. Moderate degenerative


changes with probable loose body calcification in the


suprapatellar joint with mild joint effusion.


 








Ankle X-Ray  11/06/18 00:00


IMPRESSION:  Unchanged nondisplaced fracture distal fibula.

## 2019-07-18 ENCOUNTER — HOSPITAL ENCOUNTER (EMERGENCY)
Dept: HOSPITAL 62 - ER | Age: 64
Discharge: HOME | End: 2019-07-18
Payer: MEDICARE

## 2019-07-18 VITALS — DIASTOLIC BLOOD PRESSURE: 98 MMHG | SYSTOLIC BLOOD PRESSURE: 198 MMHG
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
## 2019-07-18 DIAGNOSIS — J44.9: ICD-10-CM

## 2019-07-18 DIAGNOSIS — S80.11XA: Primary | ICD-10-CM

## 2019-07-18 DIAGNOSIS — M25.561: ICD-10-CM

## 2019-07-18 DIAGNOSIS — I10: ICD-10-CM

## 2019-07-18 DIAGNOSIS — M25.461: ICD-10-CM

## 2019-07-18 PROCEDURE — 73564 X-RAY EXAM KNEE 4 OR MORE: CPT

## 2019-07-18 PROCEDURE — 99283 EMERGENCY DEPT VISIT LOW MDM: CPT

## 2019-07-18 PROCEDURE — 96372 THER/PROPH/DIAG INJ SC/IM: CPT

## 2019-07-18 NOTE — ER DOCUMENT REPORT
HPI





- HPI


Time Seen by Provider: 07/18/19 10:29


Pain Level: 5


Notes: 





Patient is a 63-year-old female presenting to the emergency department chief 

complaint of right knee pain.  Patient reports a large dog ran directly into her

knee causing pain.  She also reports she has a lot of varicose veins in this 

area and she is having increasing pain to her veins.  She was able to ambulate 

after the incident.








- CONSTITUTIONAL


Constitutional: DENIES: Fever, Chills





- REPRODUCTIVE


Reproductive: DENIES: Pregnant:





- MUSCULOSKELETAL


Musculoskeletal: REPORTS: Extremity pain - right knee





Past Medical History





- General


Information source: Patient





- Social History


Smoking Status: Never Smoker


Frequency of alcohol use: None


Drug Abuse: None


Family History: Reviewed & Not Pertinent


Patient has suicidal ideation: No


Patient has homicidal ideation: No





- Past Medical History


Cardiac Medical History: Reports: Hx Hypertension


Pulmonary Medical History: Reports: Hx Asthma, Hx COPD, Hx Pneumonia


Endocrine Medical History: Reports: Hx Hypothyroidism


Renal/ Medical History: Denies: Hx Peritoneal Dialysis


GI Medical History: Reports: Hx Gastroesophageal Reflux Disease


Musculoskeletal Medical History: Reports Hx Arthritis, Reports Hx Fibromyalgia


Past Surgical History: Reports: Hx Hysterectomy, Hx Orthopedic Surgery - L arm





Vertical Provider Document





- CONSTITUTIONAL


Notes: 





PHYSICAL EXAMINATION:





GENERAL: Well-appearing, well-nourished and in no acute distress.





HEAD: Atraumatic, normocephalic.





EYES: Pupils equal round extraocular movements intact,  conjunctiva are normal.





ENT: Nares patent





NECK: Normal range of motion





LUNGS: No respiratory distress





Musculoskeletal: Normal range of motion, swelling to right knee specifically on 

the lateral aspect, palpable popliteal pulse, normal sensation distal to injury.





NEUROLOGICAL:  Normal speech, normal gait. 





PSYCH: Normal mood, normal affect.





SKIN: Warm, Dry, normal turgor, no rashes or lesions noted.





- INFECTION CONTROL


TRAVEL OUTSIDE OF THE [place] IN LAST 30 DAYS: No





Course





- Re-evaluation


Re-evalutation: 





X-rays are negative for any acute findings.  Patient will be placed in Ace wrap,

patient declined crutches.  Patient will be discharged home in stable condition,

plan to ice, elevate and follow-up with PCP or orthopedics if not improving.





The patient's emergency department workup and current diagnosis were explained 

to the patient and or family.  Follow-up instructions were provided.  

Medications if prescribed were discussed. Instructions for when to return to the

emergency department including specific worrisome symptoms were discussed with 

the patient and/or family.








Procedures





- Immobilization


  ** Right knee


Pre-Proc Neuro Vasc Exam: Normal


Immobilizer type: Ace wrap


Performed by: PCT


Post-Proc Neuro Vasc Exam: Normal


Alignment checked and good: Yes





Discharge





- Discharge


Clinical Impression: 


 Knee effusion, right





Contusion of right leg


Qualifiers:


 Encounter type: initial encounter Qualified Code(s): S80.11XA - Contusion of 

right lower leg, initial encounter





Condition: Stable


Disposition: HOME, SELF-CARE


Additional Instructions: 


Knee Effusion


     You have a fluid collection in the knee joint, called an effusion.  This 

fluid build up can occur from irritation of the synovial membrane lining the 

knee joint or from a more serious injury to the knee.  Irritation of the membra

ne can occur from excessive, repetitive knee activitiy, like kneeling or 

squatting for extended periods or even just excessive walking, jogging, or 

skiing.  Effusions also can occur with infections in the joint and with some 

arthritic conditions, especially  gout.  Fluid collections in these situations 

are usually yellow in color and either clear or cloudy in appearance.  

Significant injury to the knee can result in fluid collection which is partly or

entirely blood and this condition is known as a hemarthrosis of the knee joint.





     If the fluid collection is not too large and/or painful, it can be managed 

conservatively with rest, ice packs, and anti-inflammatory and pain medications 

as needed.  If the fluid collection is large and very painful, the knee joint 

can be drained (aspirated) by a relatively minor procedure of inserting a needle

in the joint and removing some or all of the fluid present.  If your knee was 

aspirated, you should rest it as much as possible for a few days, keep a 

pressure dressing around the knee and apply ice packs for at least 48 - 72 

hours.  If there are signs of developing infection such as heat and redness of 

the knee, fever, etc. you should return immediately for a recheck.








Contusion





     Your injury has resulted in a contusion -- a crushing of the deep tissues. 

No injury to important structures was detected during the physician's exam.  C

ontusions vary in the amount of pain they cause, and in the length of time 

required for healing.  Typically, the area will become bruised, and will remain 

painful to touch for two or three weeks.  However, most patients are back to 

working and playing within a few days.


     After the initial period of rest and cold-packs, your symptoms (together 

with the doctor's recommendations) will determine how rapidly you can get back 

to full activity.  Usually this means "do what feels okay, but don't do things 

that hurt."


     If re-examination was recommended, it's important to follow up as 

instructed.  Call the doctor or return any time if pain increases, if swelling 

becomes severe, if you develop numbness or weakness in an injured extremity, or 

if any other alarming symptoms occur.





Ice & Elevation





     Apply ice packs frequently against the painful area.  Many different 

schedules are recommended, such as "20 minutes on, 20 minutes off" or "one hour 

ice, two hours rest."  If you need to work, you may need to go longer between 

ice treatments.  You should plan to have the area ice packed AT LEAST one-fourth

of the time.


     The ice should be applied over the wrap, tape, or splint, or over a layer 

of cloth -- not directly against the skin.  Some ice bags have a built-in cloth 

and can be put directly on the skin.


     Your injured part should be elevated as much as possible over the next 48 

hours.  Try to keep the injury above the level of the heart. Avoid use of the 

injured area.  Elevation and rest will decrease the swelling.





Ibuprofen





     Ibuprofen is an excellent, safe drug for pain control.  In addition, it has

potent antiinflammatory effects which are beneficial, especially in the 

treatment of injuries, arthritis, or tendonitis. It's best to take ibuprofen 

with food.  Persons with ulcer disease or allergy to aspirin should notify their

physician of this before taking ibuprofen.


     Take the medication exactly as prescribed.  Don't take additional doses 

unless instructed to do so by your doctor.  If you develop wheezing, shortness 

of breath, hives, faintness, stomach pain, vomiting, or dark black stools, 

return for re-evaluation at once.





****The x-rays were negative for any fracture or dislocation.  There is a joint 

effusion noted which is described above.  Please take ibuprofen over-the-counter

as directed to help with pain and inflammation.  Pain does not gradually 

decrease over the next couple of days I would like you to follow-up with 

orthopedics.  I have included their number below.****


Prescriptions: 


Hydrocodone Bit/Acetaminophen [Hydrocodon-Acetaminophen 5-325] 1 each PO Q4H #10

tablet


Referrals: 


CORWIN ROBERTSON MD [ACTIVE STAFF] - Follow up as needed

## 2019-07-18 NOTE — RADIOLOGY REPORT (SQ)
EXAM DESCRIPTION:  KNEE RIGHT 4 VIEWS



COMPLETED DATE/TIME:  7/18/2019 11:06 am



REASON FOR STUDY:  right knee injury



COMPARISON:  None.



NUMBER OF VIEWS:  Four views.



TECHNIQUE:  AP, lateral, and both oblique radiographic images acquired of the right knee.



LIMITATIONS:  None.



FINDINGS:  MINERALIZATION: Osteopenia.

BONES: No acute fracture or dislocation.  No worrisome bone lesions.

JOINT: Small effusion.  Mild moderate degenerative compromise medial joint compartment.  Severe degen
erative narrowing patellofemoral joint

SOFT TISSUES: No soft tissue swelling.  No radio-opaque foreign body.  Bursal calcification

OTHER: No other significant finding.



IMPRESSION:  No acute posttraumatic changes.  Possible small joint effusion.  Degenerative changes as
 described above.



TECHNICAL DOCUMENTATION:  JOB ID:  [id number]

 2011 [employer]- All Rights Reserved



Reading location - IP/workstation name: [email or web address]

## 2020-02-15 ENCOUNTER — HOSPITAL ENCOUNTER (EMERGENCY)
Dept: HOSPITAL 62 - ER | Age: 65
Discharge: HOME | End: 2020-02-15
Payer: MEDICARE

## 2020-02-15 VITALS — SYSTOLIC BLOOD PRESSURE: 168 MMHG | DIASTOLIC BLOOD PRESSURE: 80 MMHG

## 2020-02-15 DIAGNOSIS — Z88.6: ICD-10-CM

## 2020-02-15 DIAGNOSIS — T46.1X6A: ICD-10-CM

## 2020-02-15 DIAGNOSIS — J44.9: ICD-10-CM

## 2020-02-15 DIAGNOSIS — Z88.0: ICD-10-CM

## 2020-02-15 DIAGNOSIS — R07.9: Primary | ICD-10-CM

## 2020-02-15 DIAGNOSIS — T50.1X6A: ICD-10-CM

## 2020-02-15 DIAGNOSIS — Z91.14: ICD-10-CM

## 2020-02-15 DIAGNOSIS — T46.5X6A: ICD-10-CM

## 2020-02-15 DIAGNOSIS — Z88.8: ICD-10-CM

## 2020-02-15 DIAGNOSIS — Z88.2: ICD-10-CM

## 2020-02-15 DIAGNOSIS — I10: ICD-10-CM

## 2020-02-15 LAB
ADD MANUAL DIFF: NO
ALBUMIN SERPL-MCNC: 4.3 G/DL (ref 3.5–5)
ALP SERPL-CCNC: 113 U/L (ref 38–126)
ANION GAP SERPL CALC-SCNC: 11 MMOL/L (ref 5–19)
AST SERPL-CCNC: 23 U/L (ref 14–36)
BASOPHILS # BLD AUTO: 0 10^3/UL (ref 0–0.2)
BASOPHILS NFR BLD AUTO: 1 % (ref 0–2)
BILIRUB DIRECT SERPL-MCNC: 0.2 MG/DL (ref 0–0.4)
BILIRUB SERPL-MCNC: 1.3 MG/DL (ref 0.2–1.3)
BUN SERPL-MCNC: 14 MG/DL (ref 7–20)
CALCIUM: 9.6 MG/DL (ref 8.4–10.2)
CHLORIDE SERPL-SCNC: 104 MMOL/L (ref 98–107)
CK MB SERPL-MCNC: 1.96 NG/ML (ref ?–4.55)
CK SERPL-CCNC: 110 U/L (ref 30–135)
CO2 SERPL-SCNC: 27 MMOL/L (ref 22–30)
EOSINOPHIL # BLD AUTO: 0.2 10^3/UL (ref 0–0.6)
EOSINOPHIL NFR BLD AUTO: 4.3 % (ref 0–6)
ERYTHROCYTE [DISTWIDTH] IN BLOOD BY AUTOMATED COUNT: 12.8 % (ref 11.5–14)
GLUCOSE SERPL-MCNC: 90 MG/DL (ref 75–110)
HCT VFR BLD CALC: 43.2 % (ref 36–47)
HGB BLD-MCNC: 14.5 G/DL (ref 12–15.5)
LYMPHOCYTES # BLD AUTO: 1.3 10^3/UL (ref 0.5–4.7)
LYMPHOCYTES NFR BLD AUTO: 32.1 % (ref 13–45)
MCH RBC QN AUTO: 30 PG (ref 27–33.4)
MCHC RBC AUTO-ENTMCNC: 33.5 G/DL (ref 32–36)
MCV RBC AUTO: 90 FL (ref 80–97)
MONOCYTES # BLD AUTO: 0.3 10^3/UL (ref 0.1–1.4)
MONOCYTES NFR BLD AUTO: 7.6 % (ref 3–13)
NEUTROPHILS # BLD AUTO: 2.2 10^3/UL (ref 1.7–8.2)
NEUTS SEG NFR BLD AUTO: 55 % (ref 42–78)
PLATELET # BLD: 214 10^3/UL (ref 150–450)
POTASSIUM SERPL-SCNC: 4.3 MMOL/L (ref 3.6–5)
PROT SERPL-MCNC: 7.6 G/DL (ref 6.3–8.2)
RBC # BLD AUTO: 4.82 10^6/UL (ref 3.72–5.28)
TOTAL CELLS COUNTED % (AUTO): 100 %
TROPONIN I SERPL-MCNC: < 0.012 NG/ML
WBC # BLD AUTO: 4 10^3/UL (ref 4–10.5)

## 2020-02-15 PROCEDURE — 36415 COLL VENOUS BLD VENIPUNCTURE: CPT

## 2020-02-15 PROCEDURE — 99285 EMERGENCY DEPT VISIT HI MDM: CPT

## 2020-02-15 PROCEDURE — 84484 ASSAY OF TROPONIN QUANT: CPT

## 2020-02-15 PROCEDURE — 82550 ASSAY OF CK (CPK): CPT

## 2020-02-15 PROCEDURE — 80053 COMPREHEN METABOLIC PANEL: CPT

## 2020-02-15 PROCEDURE — 82553 CREATINE MB FRACTION: CPT

## 2020-02-15 PROCEDURE — 71045 X-RAY EXAM CHEST 1 VIEW: CPT

## 2020-02-15 PROCEDURE — 93005 ELECTROCARDIOGRAM TRACING: CPT

## 2020-02-15 PROCEDURE — 93010 ELECTROCARDIOGRAM REPORT: CPT

## 2020-02-15 PROCEDURE — 85025 COMPLETE CBC W/AUTO DIFF WBC: CPT

## 2020-02-15 NOTE — RADIOLOGY REPORT (SQ)
EXAM DESCRIPTION:  CHEST SINGLE VIEW



COMPLETED DATE/TIME:  2/15/2020 10:24 am



REASON FOR STUDY:  chest pain



COMPARISON:  2011.



NUMBER OF VIEWS:  One view.



TECHNIQUE:  Single frontal radiographic view of the chest acquired.



LIMITATIONS:  None.



FINDINGS:  LUNGS AND PLEURA: No opacities, masses or pneumothorax. No pleural effusion.

MEDIASTINUM AND HILAR STRUCTURES: No masses.  Contour normal.

HEART AND VASCULAR STRUCTURES: Heart normal in size.  Normal vasculature.

BONES: No acute findings.

HARDWARE: None in the chest.

OTHER: No other significant finding.



IMPRESSION:  NO SIGNIFICANT RADIOGRAPHIC FINDING IN THE CHEST.



TECHNICAL DOCUMENTATION:  JOB ID:  [id number]

 2011 [employer]- All Rights Reserved



Reading location - IP/workstation name: [email or web address]

## 2020-02-15 NOTE — ER DOCUMENT REPORT
ED Cardiac





- General


Chief Complaint: Chest Pain


Stated Complaint: CHEST PAIN


Time Seen by Provider: 02/15/20 10:18


Primary Care Provider: 


IGOR TREJO MD [Primary Care Provider] - Follow up in 3-5 days


Notes: 





The patient is a 64-year-old female who presents to the emergency department 

with chest pain.  Her pain started yesterday and would come and go.  She states 

that she feels like her chest is "broken."  Patient is supposed to be taking 

losartan 100 mg and amlodipine 5 mg daily.  She is also supposed to be on Lasix.

 She attempted to take Advil, but states that has only helped a little bit.  She

was taking 800 mg 3 times a day.


TRAVEL OUTSIDE OF THE [place] IN LAST 30 DAYS: No





- Related Data


Allergies/Adverse Reactions: 


                                        





hydrochlorothiazide [Hydrochlorothiazide] Allergy (Verified 02/15/20 11:43)


   SWELLING


morphine [Morphine] Allergy (Verified 02/15/20 11:43)


   Hives


Penicillins Allergy (Verified 02/15/20 11:43)


   Hives


rofecoxib [From Vioxx] Allergy (Verified 02/15/20 11:43)


   SWELLING


Sulfa (Sulfonamide Antibiotics) Allergy (Verified 02/15/20 11:43)


   THROAT ITCHING & BURNING











Past Medical History





- Social History


Smoking Status: Unknown if Ever Smoked


Family History: Reviewed & Not Pertinent


Patient has suicidal ideation: No


Patient has homicidal ideation: No





- Past Medical History


Cardiac Medical History: Reports: Hx Hypertension


Pulmonary Medical History: Reports: Hx Asthma, Hx COPD, Hx Pneumonia


Endocrine Medical History: Reports: Hx Hypothyroidism


Renal/ Medical History: Denies: Hx Peritoneal Dialysis


GI Medical History: Reports: Hx Gastroesophageal Reflux Disease


Musculoskeletal Medical History: Reports Hx Arthritis, Reports Hx Fibromyalgia


Past Surgical History: Reports: Hx Appendectomy, Hx Hysterectomy, Hx Orthopedic 

Surgery - L arm





Review of Systems





- Review of Systems


Notes: 





REVIEW OF SYSTEMS:





CONSTITUTIONAL :    Denies recent illness.  Denies recent unintentional weight 

loss.  Denies fever,  chills, or sweats. 


EENT: Denies eye, ear, throat, or mouth pain, discharge, or symptoms.  Denies 

nasal or sinus congestion.


CARDIOVASCULAR: See HPI.


RESPIRATORY: Denies shortness of breath, cough, congestion, difficulty 

breathing, or wheezing. 


GASTROINTESTINAL: Denies nausea, vomiting, and diarrhea.  Denies abdominal pain.

 Denies constipation. 


GENITOURINARY:  Denies difficulty urinating, burning, blood in urine, urgency or

frequency.


MUSCULOSKELETAL:  Denies neck and back pain.  Denies joint pain or swelling.


SKIN:   Denies rash, itchiness, or lesions


HEMATOLOGIC :   Denies easy bruising or bleeding.


LYMPHATIC:  Denies swollen, painful, enlarged glands.


NEUROLOGICAL: Denies no numbness or tingling denies weakness.  Denies headache. 

Denies altered mental status.  Denies alteration in speech.


PSYCHIATRIC:  Denies stress, anxiety, alteration in sleep patterns, or 

depression.





All other systems reviewed and negative.





Physical Exam





- Vital signs


Vitals: 


                                        











Resp Pulse Ox


 


 16   100 


 


 02/15/20 10:13  02/15/20 10:13














- Notes


Notes: 





PHYSICAL EXAMINATION:





GENERAL: Appears well, healthy, well-nourished, no acute distress. 





HEAD:  Normocephalic, atraumatic.





EYES:  PERRL, conjunctiva normal, all extraocular movements intact, sclera 

nonicteric





ENT:  Moist mucous membranes. 





NECK: Supple, no noticeable swelling, redness, rash.  Normal range of motion.





LUNGS: Equal breath sounds bilaterally and clear to auscultation.  No wheezes 

rales or rhonchi.





CARDIOVASCULAR: S1-S2, regular rate, regular rhythm.  Radial pulses 2+, normal. 

Pain reproducible upon palpation of anterior chest.





ABDOMEN: Normoactive bowel sounds.  Soft, nontender,  no guarding, no rebound 

tenderness, and no masses palpated.





EXTREMITIES: Normal strength and range of motion, no pitting or edema.  No 

cyanosis. 





NEUROLOGICAL: Moves all extremities upon command.  Strength 5/5 in all 

extremities. 





PSYCH: Normal mood, normal affect.





SKIN: Warm, dry.  No rash, lesions, ulcerations noted.  Normal skin turgor.





Course





- Re-evaluation


Re-evalutation: 





02/15/20 12:48


Hematology and chemistries are unremarkable.  Troponin is negative.  Patient 

does have tenderness upon palpation of her anterior chest, making this more of a

costochondritis type chest pain.  Patient will follow-up with her primary care 

provider.  Advised her to continue her blood pressure medication, as she has not

been taking these.  Patient also admits to drinking 2 cans of Mountain Dew a 

day.  I told her to cut back on her caffeine intake.  She is in agreement with t

his plan.  Follow-up precautions were given.  Verbal discharge instructions were

given to the patient.  They verbalized understanding.  They are stable for 

discharge.








- Vital Signs


Vital signs: 


                                        











Temp Pulse Resp BP Pulse Ox


 


 97.6 F      16   173/85 H  100 


 


 02/15/20 10:34     02/15/20 10:13  02/15/20 10:18  02/15/20 10:17














- Laboratory


Result Diagrams: 


                                 02/15/20 09:30





                                 02/15/20 09:30





Discharge





- Discharge


Clinical Impression: 


Chest pain


Qualifiers:


 Chest pain type: unspecified Qualified Code(s): R07.9 - Chest pain, unspecified





Condition: Stable


Disposition: HOME, SELF-CARE


Additional Instructions: 


You were seen today in the emergency department for chest pain.  Your work-up 

today is normal.  Your blood pressure improved with your blood pressure 

medication.  Tonight, please take your losartan.  Tomorrow start your losartan 

in the morning and take your amlodipine at night.  Please make sure you are taki

ng your Lasix as prescribed.  These medications will help prevent you a heart 

attack or stroke.  For your pain, you can take ibuprofen (Advil) 600 mg and 

acetaminophen (Tylenol) 1000 mg every 6 hours.  Please follow-up with your 

primary care provider in regards to this visit.





It may also help to talk to a counselor about your recent loss of your parents.


Referrals: 


IGOR TREJO MD [Primary Care Provider] - Follow up in 3-5 days

## 2020-02-15 NOTE — EKG REPORT
SEVERITY:- BORDERLINE ECG -

SINUS RHYTHM WITH PACS

BORDERLINE LEFT AXIS DEVIATION

LA ENLARGEMENT

:

Confirmed by: Mustapha Camacho MD 15-Feb-2020 10:24:48